# Patient Record
Sex: FEMALE | Race: WHITE | Employment: FULL TIME | URBAN - NONMETROPOLITAN AREA
[De-identification: names, ages, dates, MRNs, and addresses within clinical notes are randomized per-mention and may not be internally consistent; named-entity substitution may affect disease eponyms.]

---

## 2023-06-19 PROBLEM — E66.811 CLASS 1 OBESITY DUE TO EXCESS CALORIES WITHOUT SERIOUS COMORBIDITY WITH BODY MASS INDEX (BMI) OF 33.0 TO 33.9 IN ADULT: Status: ACTIVE | Noted: 2023-06-19

## 2024-08-30 ENCOUNTER — HOSPITAL ENCOUNTER (OUTPATIENT)
Dept: WOMENS IMAGING | Age: 44
Discharge: HOME | End: 2024-08-30
Attending: ADVANCED PRACTICE MIDWIFE
Payer: COMMERCIAL

## 2024-08-30 VITALS — BODY MASS INDEX: 34.32 KG/M2 | HEIGHT: 65 IN | WEIGHT: 206 LBS

## 2024-08-30 DIAGNOSIS — Z12.31 SCREENING MAMMOGRAM, ENCOUNTER FOR: ICD-10-CM

## 2024-08-30 PROCEDURE — 77063 BREAST TOMOSYNTHESIS BI: CPT

## 2025-04-04 ENCOUNTER — HOSPITAL ENCOUNTER (OUTPATIENT)
Age: 45
Discharge: HOME | End: 2025-04-04
Payer: COMMERCIAL

## 2025-04-04 ENCOUNTER — HOSPITAL ENCOUNTER (OUTPATIENT)
Dept: GENERAL RADIOLOGY | Age: 45
End: 2025-04-04
Payer: COMMERCIAL

## 2025-04-04 DIAGNOSIS — M79.672 LEFT FOOT PAIN: ICD-10-CM

## 2025-04-04 PROCEDURE — 73630 X-RAY EXAM OF FOOT: CPT

## 2025-05-17 DIAGNOSIS — N92.6 IRREGULAR MENSES: ICD-10-CM

## 2025-05-22 RX ORDER — NORGESTREL AND ETHINYL ESTRADIOL 0.3-0.03MG
1 KIT ORAL DAILY
Qty: 28 TABLET | Refills: 2 | Status: SHIPPED | OUTPATIENT
Start: 2025-05-22

## 2025-07-01 ENCOUNTER — OFFICE VISIT (OUTPATIENT)
Dept: OBGYN | Age: 45
End: 2025-07-01
Payer: COMMERCIAL

## 2025-07-01 VITALS
DIASTOLIC BLOOD PRESSURE: 78 MMHG | WEIGHT: 217.8 LBS | BODY MASS INDEX: 35 KG/M2 | HEIGHT: 66 IN | SYSTOLIC BLOOD PRESSURE: 124 MMHG

## 2025-07-01 DIAGNOSIS — R23.2 HOT FLASHES: ICD-10-CM

## 2025-07-01 DIAGNOSIS — R45.86 MOOD SWINGS: ICD-10-CM

## 2025-07-01 DIAGNOSIS — Z12.11 SCREEN FOR COLON CANCER: ICD-10-CM

## 2025-07-01 DIAGNOSIS — G47.00 INSOMNIA, UNSPECIFIED TYPE: ICD-10-CM

## 2025-07-01 DIAGNOSIS — Z01.419 ENCOUNTER FOR ANNUAL ROUTINE GYNECOLOGICAL EXAMINATION: Primary | ICD-10-CM

## 2025-07-01 PROCEDURE — 99396 PREV VISIT EST AGE 40-64: CPT | Performed by: ADVANCED PRACTICE MIDWIFE

## 2025-07-01 RX ORDER — MELOXICAM 15 MG/1
15 TABLET ORAL DAILY
COMMUNITY
Start: 2025-05-21

## 2025-07-01 NOTE — PROGRESS NOTES
YEARLY PHYSICAL    Date of service: 2025    Erendira Durbin  Is a 45 y.o.  , female    PT's PCP is: Wilson Rivera MD     : 1980                                             Subjective:       Patient's last menstrual period was 2025 (exact date).     Are your menses regular: yes    OB History    Para Term  AB Living   3 2 2  1 2   SAB IAB Ectopic Molar Multiple Live Births   1     2      # Outcome Date GA Lbr Liu/2nd Weight Sex Type Anes PTL Lv   3 Term 06    F Vag-Spont EPI  ALE   2 Term 04    M Vag-Spont EPI  ALE   1 SAB 2001     SAB         Birth Comments: D&C        Social History     Tobacco Use   Smoking Status Former   Smokeless Tobacco Never        Social History     Substance and Sexual Activity   Alcohol Use Yes    Alcohol/week: 0.8 standard drinks of alcohol    Types: 1 Standard drinks or equivalent per week    Comment: social       Family History   Problem Relation Age of Onset    Other Mother         osteoporosis    Thyroid Disease Mother     Hypertension Father     Anemia Father     Other Father         GI disturbance    Heart Disease Father     Migraines Sister     Cancer Maternal Grandmother         lymph nodes    Stroke Maternal Grandfather     Dementia Paternal Grandmother     Other Other         no family hx of breast or ovarian cancer       Any family history of breast or ovarian cancer: No    Any family history of blood clots: Yes    Allergies: Patient has no known allergies.      Current Outpatient Medications:     meloxicam (MOBIC) 15 MG tablet, Take 1 tablet by mouth daily, Disp: , Rfl:     norgestrel-ethinyl estradiol (ELINEST) 0.3-30 MG-MCG per tablet, Take 1 tablet by mouth once daily, Disp: 28 tablet, Rfl: 2    Multiple Vitamins-Minerals (THERAPEUTIC MULTIVITAMIN-MINERALS) tablet, Take 1 tablet by mouth daily, Disp: , Rfl:     predniSONE (DELTASONE) 10 MG tablet, TAKE 4

## 2025-07-04 ASSESSMENT — ENCOUNTER SYMPTOMS
BACK PAIN: 0
SHORTNESS OF BREATH: 0
ABDOMINAL PAIN: 0

## 2025-07-09 LAB
T4 FREE: 1.43 NG/DL (ref 0.8–1.9)
THYROID PEROXIDASE ANTIBODY: 18 IU/ML
TSH SERPL DL<=0.05 MIU/L-ACNC: 1.13 UIU/ML (ref 0.27–4.2)

## 2025-07-29 ENCOUNTER — TELEPHONE (OUTPATIENT)
Dept: SURGERY | Age: 45
End: 2025-07-29

## 2025-07-29 ENCOUNTER — OFFICE VISIT (OUTPATIENT)
Dept: GASTROENTEROLOGY | Age: 45
End: 2025-07-29

## 2025-07-29 VITALS
HEART RATE: 86 BPM | DIASTOLIC BLOOD PRESSURE: 72 MMHG | BODY MASS INDEX: 34.86 KG/M2 | WEIGHT: 216 LBS | OXYGEN SATURATION: 98 % | SYSTOLIC BLOOD PRESSURE: 118 MMHG | RESPIRATION RATE: 18 BRPM

## 2025-07-29 DIAGNOSIS — Z12.11 ENCOUNTER FOR SCREENING COLONOSCOPY: ICD-10-CM

## 2025-07-29 DIAGNOSIS — Z12.11 COLON CANCER SCREENING: Primary | ICD-10-CM

## 2025-07-29 PROCEDURE — NBSRV NON-BILLABLE SERVICE: Performed by: NURSE PRACTITIONER

## 2025-07-29 ASSESSMENT — ENCOUNTER SYMPTOMS
ALLERGIC/IMMUNOLOGIC NEGATIVE: 1
RESPIRATORY NEGATIVE: 1
GASTROINTESTINAL NEGATIVE: 1

## 2025-07-29 NOTE — PATIENT INSTRUCTIONS
SURVEY:    You may be receiving a survey from [facility] regarding your visit today.    You may get this in the mail, through your MyChart, or in your email.     Please complete the survey to enable us to provide the highest quality of care to you and your family.    If you cannot score us a very good (5 Stars) on any question, please call the office to discuss how we could of made your experience exceptional.    Thank you!    General Surgery    MD Dr. Sharonda Thompson, DO  Dr. Tesfaye Amaya, DO  Shawna Valenzuela, CARMELITA-CNP    Pain Mgmt.  Dr. Behzad Mclean, DO  TIMOTHY Mesa, ANGELES Spain LPN Jena Adams, MA Emily Akers, MA    Phone: [phone]  Fax: [phone]    Office Hours:   M-TH 8-5, F: 8-12

## 2025-07-29 NOTE — TELEPHONE ENCOUNTER
Patient scheduled for colonoscopy on 10/10/2025 with Dr. Spence. Written prep instructions reviewed with patient. Questions asked and answered. Patient also advised to read instructions again 1 week prior to the procedure and to call the office with any questions. Surgery department calls the day prior to the procedure to give time of arrival. Also made aware that the PAT department will reach out to review medical history and medication instructions about a week prior to procedure date. Case request sent

## 2025-07-29 NOTE — PROGRESS NOTES
relative with a history of colorectal cancer.  She is due for colorectal screening colonoscopy.      ASA: 2  Mallampati Score: 2    Plan    1. Colon cancer screening  - COLONOSCOPY (Screening); Future        The benefits and risks associated with a colonoscopy and or EGD have been outlined to the patient. The patient was advised that complications include, but are not limited to, perforation, bleeding, infection, electrolyte disturbance from the colon prep and complications associated with sedation (including heart and lung issues that could result in death). The patient verbalized understanding of the risks and consents to the scheduling of procedure.     Spent 20 minutes with the patient with greater than 50 percent of the time was spent on face-to-face time in discussion with the patient regarding diagnostic options/results, treatment options, counseling, and follow-up plan.      Shawna Valenzuela, APRN - CNP    *This report was transcribed using voice recognition software. Every effort was made to ensure accuracy; however, inadvertent computerized transcription errors may be present.

## 2025-08-10 DIAGNOSIS — N92.6 IRREGULAR MENSES: ICD-10-CM

## 2025-08-11 RX ORDER — NORGESTREL AND ETHINYL ESTRADIOL 0.3-0.03MG
1 KIT ORAL DAILY
Qty: 28 TABLET | Refills: 11 | Status: SHIPPED | OUTPATIENT
Start: 2025-08-11